# Patient Record
Sex: FEMALE | Race: WHITE | NOT HISPANIC OR LATINO
[De-identification: names, ages, dates, MRNs, and addresses within clinical notes are randomized per-mention and may not be internally consistent; named-entity substitution may affect disease eponyms.]

---

## 2017-12-28 ENCOUNTER — TRANSCRIPTION ENCOUNTER (OUTPATIENT)
Age: 62
End: 2017-12-28

## 2017-12-28 ENCOUNTER — OUTPATIENT (OUTPATIENT)
Dept: OUTPATIENT SERVICES | Facility: HOSPITAL | Age: 62
LOS: 1 days | End: 2017-12-28
Payer: COMMERCIAL

## 2017-12-28 DIAGNOSIS — M17.12 UNILATERAL PRIMARY OSTEOARTHRITIS, LEFT KNEE: ICD-10-CM

## 2018-01-08 ENCOUNTER — OUTPATIENT (OUTPATIENT)
Dept: OUTPATIENT SERVICES | Facility: HOSPITAL | Age: 63
LOS: 1 days | End: 2018-01-08
Payer: COMMERCIAL

## 2018-01-08 ENCOUNTER — TRANSCRIPTION ENCOUNTER (OUTPATIENT)
Age: 63
End: 2018-01-08

## 2018-01-08 DIAGNOSIS — M17.12 UNILATERAL PRIMARY OSTEOARTHRITIS, LEFT KNEE: ICD-10-CM

## 2018-01-08 PROCEDURE — 77002 NEEDLE LOCALIZATION BY XRAY: CPT

## 2018-01-08 PROCEDURE — 64450 NJX AA&/STRD OTHER PN/BRANCH: CPT | Mod: LT

## 2019-07-03 ENCOUNTER — TRANSCRIPTION ENCOUNTER (OUTPATIENT)
Age: 64
End: 2019-07-03

## 2021-02-03 ENCOUNTER — OUTPATIENT (OUTPATIENT)
Dept: OUTPATIENT SERVICES | Facility: HOSPITAL | Age: 66
LOS: 1 days | Discharge: HOME | End: 2021-02-03
Payer: COMMERCIAL

## 2021-02-03 ENCOUNTER — APPOINTMENT (OUTPATIENT)
Dept: PODIATRY | Facility: HOSPITAL | Age: 66
End: 2021-02-03
Payer: COMMERCIAL

## 2021-02-03 VITALS
HEART RATE: 70 BPM | OXYGEN SATURATION: 99 % | TEMPERATURE: 98 F | SYSTOLIC BLOOD PRESSURE: 137 MMHG | BODY MASS INDEX: 31.41 KG/M2 | RESPIRATION RATE: 18 BRPM | WEIGHT: 160 LBS | HEIGHT: 60 IN | DIASTOLIC BLOOD PRESSURE: 73 MMHG

## 2021-02-03 DIAGNOSIS — S91.309A UNSPECIFIED OPEN WOUND, UNSPECIFIED FOOT, INITIAL ENCOUNTER: ICD-10-CM

## 2021-02-03 DIAGNOSIS — M19.90 UNSPECIFIED OSTEOARTHRITIS, UNSPECIFIED SITE: ICD-10-CM

## 2021-02-03 DIAGNOSIS — Z83.3 FAMILY HISTORY OF DIABETES MELLITUS: ICD-10-CM

## 2021-02-03 DIAGNOSIS — Z86.39 PERSONAL HISTORY OF OTHER ENDOCRINE, NUTRITIONAL AND METABOLIC DISEASE: ICD-10-CM

## 2021-02-03 DIAGNOSIS — L84 CORNS AND CALLOSITIES: ICD-10-CM

## 2021-02-03 DIAGNOSIS — I87.2 VENOUS INSUFFICIENCY (CHRONIC) (PERIPHERAL): ICD-10-CM

## 2021-02-03 DIAGNOSIS — G81.94 HEMIPLEGIA, UNSPECIFIED AFFECTING LEFT NONDOMINANT SIDE: ICD-10-CM

## 2021-02-03 DIAGNOSIS — Z82.49 FAMILY HISTORY OF ISCHEMIC HEART DISEASE AND OTHER DISEASES OF THE CIRCULATORY SYSTEM: ICD-10-CM

## 2021-02-03 DIAGNOSIS — Z86.73 PERSONAL HISTORY OF TRANSIENT ISCHEMIC ATTACK (TIA), AND CEREBRAL INFARCTION W/OUT RESIDUAL DEFICITS: ICD-10-CM

## 2021-02-03 DIAGNOSIS — Z87.891 PERSONAL HISTORY OF NICOTINE DEPENDENCE: ICD-10-CM

## 2021-02-03 DIAGNOSIS — Z87.19 PERSONAL HISTORY OF OTHER DISEASES OF THE DIGESTIVE SYSTEM: ICD-10-CM

## 2021-02-03 DIAGNOSIS — Z86.79 PERSONAL HISTORY OF OTHER DISEASES OF THE CIRCULATORY SYSTEM: ICD-10-CM

## 2021-02-03 PROCEDURE — G0463: CPT

## 2021-02-03 PROCEDURE — 99203 OFFICE O/P NEW LOW 30 MIN: CPT

## 2021-02-03 RX ORDER — AMLODIPINE BESYLATE 5 MG/1
5 TABLET ORAL DAILY
Refills: 0 | Status: ACTIVE | COMMUNITY

## 2021-02-03 RX ORDER — ATORVASTATIN CALCIUM 40 MG/1
40 TABLET, FILM COATED ORAL DAILY
Refills: 0 | Status: ACTIVE | COMMUNITY

## 2021-02-03 RX ORDER — TRAMADOL HYDROCHLORIDE 50 MG/1
50 TABLET, COATED ORAL 4 TIMES DAILY
Refills: 0 | Status: ACTIVE | COMMUNITY

## 2021-02-03 RX ORDER — DOCUSATE SODIUM 100 MG/1
100 CAPSULE ORAL DAILY
Refills: 0 | Status: ACTIVE | COMMUNITY

## 2021-02-03 RX ORDER — SENNOSIDES 8.6 MG/1
TABLET ORAL DAILY
Refills: 0 | Status: ACTIVE | COMMUNITY

## 2021-02-03 RX ORDER — METOPROLOL TARTRATE 50 MG/1
50 TABLET, FILM COATED ORAL DAILY
Refills: 0 | Status: ACTIVE | COMMUNITY

## 2021-02-03 RX ORDER — ASPIRIN 81 MG
81 TABLET, DELAYED RELEASE (ENTERIC COATED) ORAL DAILY
Refills: 0 | Status: ACTIVE | COMMUNITY

## 2021-02-03 RX ORDER — HYOSCYAMINE SULFATE 0.375 MG
0.38 CAPSULE, EXTENDED RELEASE 12 HR ORAL
Refills: 0 | Status: ACTIVE | COMMUNITY

## 2021-02-03 RX ORDER — TRAZODONE HYDROCHLORIDE 50 MG/1
50 TABLET ORAL DAILY
Refills: 0 | Status: ACTIVE | COMMUNITY

## 2021-02-03 RX ORDER — PANTOPRAZOLE 40 MG/1
40 TABLET, DELAYED RELEASE ORAL TWICE DAILY
Refills: 0 | Status: ACTIVE | COMMUNITY

## 2021-02-03 RX ORDER — CHLORHEXIDINE GLUCONATE 4 %
5 LIQUID (ML) TOPICAL DAILY
Refills: 0 | Status: ACTIVE | COMMUNITY

## 2021-02-03 RX ORDER — DULOXETINE HYDROCHLORIDE 20 MG/1
20 CAPSULE, DELAYED RELEASE ORAL DAILY
Refills: 0 | Status: ACTIVE | COMMUNITY

## 2021-02-04 ENCOUNTER — NON-APPOINTMENT (OUTPATIENT)
Age: 66
End: 2021-02-04

## 2021-02-04 DIAGNOSIS — Z88.5 ALLERGY STATUS TO NARCOTIC AGENT: ICD-10-CM

## 2021-02-04 DIAGNOSIS — Z82.49 FAMILY HISTORY OF ISCHEMIC HEART DISEASE AND OTHER DISEASES OF THE CIRCULATORY SYSTEM: ICD-10-CM

## 2021-02-04 DIAGNOSIS — Z79.899 OTHER LONG TERM (CURRENT) DRUG THERAPY: ICD-10-CM

## 2021-02-04 DIAGNOSIS — I10 ESSENTIAL (PRIMARY) HYPERTENSION: ICD-10-CM

## 2021-02-04 DIAGNOSIS — E78.00 PURE HYPERCHOLESTEROLEMIA, UNSPECIFIED: ICD-10-CM

## 2021-02-04 DIAGNOSIS — Z79.82 LONG TERM (CURRENT) USE OF ASPIRIN: ICD-10-CM

## 2021-02-04 DIAGNOSIS — S90.812A ABRASION, LEFT FOOT, INITIAL ENCOUNTER: ICD-10-CM
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
## 2021-02-04 DIAGNOSIS — I69.352 HEMIPLEGIA AND HEMIPARESIS FOLLOWING CEREBRAL INFARCTION AFFECTING LEFT DOMINANT SIDE: ICD-10-CM
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
## 2021-02-04 DIAGNOSIS — Z87.891 PERSONAL HISTORY OF NICOTINE DEPENDENCE: ICD-10-CM
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
## 2021-02-04 DIAGNOSIS — Z90.710 ACQUIRED ABSENCE OF BOTH CERVIX AND UTERUS: ICD-10-CM

## 2021-02-04 DIAGNOSIS — Z83.3 FAMILY HISTORY OF DIABETES MELLITUS: ICD-10-CM

## 2021-02-04 DIAGNOSIS — L84 CORNS AND CALLOSITIES: ICD-10-CM

## 2021-02-04 DIAGNOSIS — Z98.1 ARTHRODESIS STATUS: ICD-10-CM

## 2021-02-04 PROBLEM — I87.2 VENOUS INSUFFICIENCY: Status: RESOLVED | Noted: 2021-02-03 | Resolved: 2021-02-04

## 2021-02-04 NOTE — REVIEW OF SYSTEMS
[Fever] : no fever [Eye Pain] : no eye pain [Loss Of Hearing] : no hearing loss [Shortness Of Breath] : no shortness of breath [Abdominal Pain] : no abdominal pain [Joint Stiffness] : joint stiffness [Skin Lesions] : no skin lesions [Skin Wound] : no skin wound [Anxiety] : no anxiety [Easy Bleeding] : no tendency for easy bleeding [Negative] : Endocrine [FreeTextEntry5] : HTN, HLD  [de-identified] : s/p left side stroke

## 2021-02-04 NOTE — HISTORY OF PRESENT ILLNESS
[FreeTextEntry1] : The wound is located on patient's left lateral foot. Patient reports that she thinks her leg brace caused the wound on her left lateral foot.  In 12/2020 patient saw her PCP and was tx with oral antibiotic for left lateral foot wound.  Patient reports that she was applying a triple antibiotic on wound and reports that the wound has closed up.   Patient also presents with ecchymosis areas on left foot and left great toe with fungus.  Patient reports that she also has a hx of blisters forming on her foot.  Patient's PCP recommended patient see a DPM and she chose the [facility].  Patient presents with a left leg brace from thigh to foot.

## 2021-02-04 NOTE — VITALS
[FreeTextEntry3] : left foot - 5/10 - radiating [FreeTextEntry1] : lying down with leg elevating [FreeTextEntry2] : weight bearing [FreeTextEntry4] : leg elevating

## 2021-02-04 NOTE — ASSESSMENT
[Verbal] : Verbal [Patient] : Patient [Dressing changes] : dressing changes [Foot Care] : foot care [Skin Care] : skin care [Signs and symptoms of infection] : sign and symptoms of infection [How and When to Call] : how and when to call [Labs and Tests] : labs and tests [Patient responsibility to plan of care] : patient responsibility to plan of care [] : Yes [Stable] : stable [Home] : Home [Wheelchair] : Wheelchair [Not Applicable - Long Term Care/Home Health Agency] : Long Term Care/Home Health Agency: Not Applicable [Spouse] : Spouse [Good - alert, interested, motivated] : Good - alert, interested, motivated [Verbalizes knowledge/Understanding] : Verbalizes knowledge/understanding [Pain Management] : pain management [FreeTextEntry2] : Promote optimal skin integrity, offloading, infection prevention, pain management [FreeTextEntry4] : Circulation:\par \par Dorsalis Pedis:  bilateral palpable\par Posterior Tibialis:  bilateral unable to palpate\par Doppler Pulses:  bilateral  present\par Extremity Color:  Right pink, Left pink with scattered areas of ecchymosis\par Extremity Temperature:  bilateral warm\par Capillary Refill:  bilateral <3 sec \par RAJ: Non-invasive vascular studies (venous and arterial) ordered - submitted for authorization\par \par Orthotist consult next visit to evaluate left leg to foot brace and possible need for a new brace.  Nurse manager to arrange with BARON Crespo orthotist.\par \par Xray ordered to be done next visit.\par \par Instructed patient to schedule an appointment with [facility] in [address] [address].  Provided patient with contact #.\par  \par f/u 2 weeks

## 2021-02-04 NOTE — PHYSICAL EXAM
[1+] : left 1+ [Ankle Swelling (On Exam)] : not present [Varicose Veins Of Lower Extremities] : not present [Purpura] : no purpura  [Petechiae] : no petechiae [Skin Ulcer] : no ulcer [Skin Induration] : no induration [Alert] : alert [Oriented to Person] : oriented to person [Oriented to Place] : oriented to place [Calm] : calm [de-identified] : calm , present with her  [de-identified] : HTN, HLD [de-identified] : patient no ambulatory , left side stroke [de-identified] : abrasions from brace on the left heel and lateral left foot  [de-identified] : left side stroke [FreeTextEntry1] : Left lateral foot - callus, peeling epithelium [de-identified] : none [de-identified] : NSC [FreeTextEntry7] : Left plantar heel - ecchymosis [de-identified] : Left hallux - ecchymosis - thickened toe nail - no open wound

## 2021-02-04 NOTE — PLAN
[FreeTextEntry1] : Patient to off load heels while sleeping , patient brace to be evaluated by orthotist Spent 30  minutes for patient care and medical decision making.\par

## 2021-02-17 ENCOUNTER — APPOINTMENT (OUTPATIENT)
Dept: PODIATRY | Facility: HOSPITAL | Age: 66
End: 2021-02-17
Payer: COMMERCIAL

## 2021-02-17 ENCOUNTER — RESULT REVIEW (OUTPATIENT)
Age: 66
End: 2021-02-17

## 2021-02-17 ENCOUNTER — OUTPATIENT (OUTPATIENT)
Dept: OUTPATIENT SERVICES | Facility: HOSPITAL | Age: 66
LOS: 1 days | Discharge: HOME | End: 2021-02-17
Payer: COMMERCIAL

## 2021-02-17 VITALS
BODY MASS INDEX: 31.41 KG/M2 | HEIGHT: 60 IN | OXYGEN SATURATION: 98 % | WEIGHT: 160 LBS | SYSTOLIC BLOOD PRESSURE: 141 MMHG | RESPIRATION RATE: 20 BRPM | DIASTOLIC BLOOD PRESSURE: 80 MMHG | HEART RATE: 68 BPM

## 2021-02-17 DIAGNOSIS — S90.812A ABRASION, LEFT FOOT, INITIAL ENCOUNTER: ICD-10-CM

## 2021-02-17 DIAGNOSIS — S90.812D: ICD-10-CM

## 2021-02-17 DIAGNOSIS — S91.309A UNSPECIFIED OPEN WOUND, UNSPECIFIED FOOT, INITIAL ENCOUNTER: ICD-10-CM

## 2021-02-17 PROCEDURE — 73630 X-RAY EXAM OF FOOT: CPT | Mod: 26,50

## 2021-02-17 PROCEDURE — 99213 OFFICE O/P EST LOW 20 MIN: CPT

## 2021-02-17 PROCEDURE — G0463: CPT

## 2021-02-17 PROCEDURE — 73630 X-RAY EXAM OF FOOT: CPT

## 2021-02-18 ENCOUNTER — NON-APPOINTMENT (OUTPATIENT)
Age: 66
End: 2021-02-18

## 2021-02-18 DIAGNOSIS — Z88.5 ALLERGY STATUS TO NARCOTIC AGENT: ICD-10-CM

## 2021-02-18 DIAGNOSIS — Z90.710 ACQUIRED ABSENCE OF BOTH CERVIX AND UTERUS: ICD-10-CM

## 2021-02-18 DIAGNOSIS — Z79.82 LONG TERM (CURRENT) USE OF ASPIRIN: ICD-10-CM

## 2021-02-18 DIAGNOSIS — L84 CORNS AND CALLOSITIES: ICD-10-CM

## 2021-02-18 DIAGNOSIS — S90.812D ABRASION, LEFT FOOT, SUBSEQUENT ENCOUNTER: ICD-10-CM

## 2021-02-18 DIAGNOSIS — Z87.891 PERSONAL HISTORY OF NICOTINE DEPENDENCE: ICD-10-CM
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
## 2021-02-18 DIAGNOSIS — Z98.1 ARTHRODESIS STATUS: ICD-10-CM

## 2021-02-18 DIAGNOSIS — Z83.3 FAMILY HISTORY OF DIABETES MELLITUS: ICD-10-CM

## 2021-02-18 DIAGNOSIS — I69.352 HEMIPLEGIA AND HEMIPARESIS FOLLOWING CEREBRAL INFARCTION AFFECTING LEFT DOMINANT SIDE: ICD-10-CM

## 2021-02-18 DIAGNOSIS — I10 ESSENTIAL (PRIMARY) HYPERTENSION: ICD-10-CM
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
## 2021-02-18 DIAGNOSIS — Z82.49 FAMILY HISTORY OF ISCHEMIC HEART DISEASE AND OTHER DISEASES OF THE CIRCULATORY SYSTEM: ICD-10-CM

## 2021-02-18 DIAGNOSIS — Z79.899 OTHER LONG TERM (CURRENT) DRUG THERAPY: ICD-10-CM

## 2021-02-18 DIAGNOSIS — E78.00 PURE HYPERCHOLESTEROLEMIA, UNSPECIFIED: ICD-10-CM

## 2021-02-18 PROBLEM — S90.812A: Status: ACTIVE | Noted: 2021-02-04

## 2021-02-18 NOTE — PHYSICAL EXAM
[1+] : left 1+ [Alert] : alert [Oriented to Person] : oriented to person [Oriented to Place] : oriented to place [Calm] : calm [Ankle Swelling (On Exam)] : not present [Varicose Veins Of Lower Extremities] : not present [Purpura] : no purpura  [Petechiae] : no petechiae [Skin Ulcer] : no ulcer [Skin Induration] : no induration [de-identified] : calm , present with her  [de-identified] : patient no ambulatory , left side stroke [de-identified] : HTN, HLD [de-identified] : abrasions from brace on the left heel and lateral left foot  [de-identified] : left side stroke [FreeTextEntry1] : Left Lateral Foot - Callus, Peeling Epithelium [FreeTextEntry7] : Left Plantar Heel - Ecchymosis [de-identified] : Cleansed with Normal Saline  [de-identified] : DPM trimmed nail  [de-identified] : Left Hallux - Ecchymosis - thickened toe nail - no open wound [TWNoteComboBox4] : None

## 2021-02-18 NOTE — VITALS
[] : No [de-identified] : Pt reports pain 6/10 [FreeTextEntry3] : Left Dorsal Foot  [FreeTextEntry1] : Elevation  [FreeTextEntry4] : legs elevated  [FreeTextEntry2] : Weight Bearing

## 2021-02-18 NOTE — REVIEW OF SYSTEMS
[Joint Stiffness] : joint stiffness [Negative] : Endocrine [Fever] : no fever [Eye Pain] : no eye pain [Loss Of Hearing] : no hearing loss [Shortness Of Breath] : no shortness of breath [Abdominal Pain] : no abdominal pain [Skin Lesions] : no skin lesions [Skin Wound] : no skin wound [Anxiety] : no anxiety [Easy Bleeding] : no tendency for easy bleeding [FreeTextEntry5] : HTN, HLD  [de-identified] : s/p left side stroke

## 2021-02-18 NOTE — ASSESSMENT
[Stable] : stable [Wheelchair] : Wheelchair [Home] : Home [Not Applicable - Long Term Care/Home Health Agency] : Long Term Care/Home Health Agency: Not Applicable [Verbal] : Verbal [Patient] : Patient [Good - alert, interested, motivated] : Good - alert, interested, motivated [Verbalizes knowledge/Understanding] : Verbalizes knowledge/understanding [Foot Care] : foot care [Skin Care] : skin care [Pressure relief] : pressure relief [Signs and symptoms of infection] : sign and symptoms of infection [How and When to Call] : how and when to call [Pain Management] : pain management [Off-loading] : off-loading [Patient responsibility to plan of care] : patient responsibility to plan of care [] : No [FreeTextEntry3] : No open wounds but pt is still experiencing pain  [FreeTextEntry2] : Infection Prevention\par Localized Wound Care\par Offloading / Pressure Relief\par  [FreeTextEntry4] : F/U to [facility] for assessment in Two Weeks\par Advised consult with Dr. Esqueda \par Physician reviewed X-rays of bilateral feet with pt and spouse\yuliana Crespo from [facility] measured pt for brace

## 2021-02-18 NOTE — PLAN
[FreeTextEntry1] : Patient to have vascular studies and orthotist is in process of making new braces for patient left leg . No open wounds and patient currently has no pain Spent 20 minutes for patient care and medical decision making.\par

## 2021-02-18 NOTE — HISTORY OF PRESENT ILLNESS
[FreeTextEntry1] : Generalized pain of the left foot , with mottling of the skin and cooler temperature compared to the right . Pain is transient and not regular in occurrence .

## 2021-02-24 ENCOUNTER — OUTPATIENT (OUTPATIENT)
Dept: OUTPATIENT SERVICES | Facility: HOSPITAL | Age: 66
LOS: 1 days | Discharge: HOME | End: 2021-02-24
Payer: COMMERCIAL

## 2021-02-24 ENCOUNTER — APPOINTMENT (OUTPATIENT)
Dept: VASCULAR SURGERY | Facility: CLINIC | Age: 66
End: 2021-02-24
Payer: COMMERCIAL

## 2021-02-24 VITALS
DIASTOLIC BLOOD PRESSURE: 77 MMHG | SYSTOLIC BLOOD PRESSURE: 132 MMHG | HEART RATE: 72 BPM | TEMPERATURE: 97.3 F | OXYGEN SATURATION: 99 % | HEIGHT: 60 IN | BODY MASS INDEX: 31.41 KG/M2 | RESPIRATION RATE: 20 BRPM | WEIGHT: 160 LBS

## 2021-02-24 DIAGNOSIS — S90.812D ABRASION, LEFT FOOT, SUBSEQUENT ENCOUNTER: ICD-10-CM

## 2021-02-24 DIAGNOSIS — R23.0 CYANOSIS: ICD-10-CM

## 2021-02-24 PROCEDURE — G0463: CPT

## 2021-02-24 PROCEDURE — 99072 ADDL SUPL MATRL&STAF TM PHE: CPT

## 2021-02-24 PROCEDURE — 99203 OFFICE O/P NEW LOW 30 MIN: CPT

## 2021-02-24 NOTE — REASON FOR VISIT
[Consultation] : a consultation visit [FreeTextEntry1] : L foot pain with cyanosis and decreased temperature.

## 2021-02-24 NOTE — ASSESSMENT
[FreeTextEntry1] : 64 yo M with hx of hemorrhagic stroke. Increasing pain over dorsum of L foot with decreased temperature and cyanosis. She does have a palpable DP pulse. No clear etiology for pain. It may represent post stroke hyperalgesia with autonomic dysfunction.

## 2021-02-24 NOTE — REVIEW OF SYSTEMS
[Limb Pain] : limb pain [As Noted in HPI] : as noted in HPI [Limb Weakness] : limb weakness [Difficulty Walking] : difficulty walking [Negative] : Heme/Lymph

## 2021-02-24 NOTE — PHYSICAL EXAM
[1+] : left 1+ [2+] : left 2+ [Skin Ulcer] : no ulcer [Skin Induration] : no induration [Alert] : alert [Oriented to Person] : oriented to person [Oriented to Place] : oriented to place [Oriented to Time] : oriented to time [Calm] : calm [de-identified] : L UE and LLE paretic [FreeTextEntry1] : L foot is cyanotic with 2 sec cap ref. DP is palpable. \par Cold to touch

## 2021-02-24 NOTE — HISTORY OF PRESENT ILLNESS
[FreeTextEntry1] : 66 yo F with hx of HTN that  suffered a hemorrhagic stroke 2-3 years ago after excessing. 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 She has been having pain over the dorsum of the L foot. Pain is not associated to any position or specific activity. Patient walks with a L leg brace and cane. Denies any other medical problem or problems in the LLE prior to the stroke.

## 2021-02-25 DIAGNOSIS — Z87.891 PERSONAL HISTORY OF NICOTINE DEPENDENCE: ICD-10-CM

## 2021-02-25 DIAGNOSIS — I10 ESSENTIAL (PRIMARY) HYPERTENSION: ICD-10-CM

## 2021-02-25 DIAGNOSIS — Z79.899 OTHER LONG TERM (CURRENT) DRUG THERAPY: ICD-10-CM

## 2021-02-25 DIAGNOSIS — M79.672 PAIN IN LEFT FOOT: ICD-10-CM

## 2021-02-25 DIAGNOSIS — Z88.5 ALLERGY STATUS TO NARCOTIC AGENT: ICD-10-CM

## 2021-02-25 DIAGNOSIS — Z82.49 FAMILY HISTORY OF ISCHEMIC HEART DISEASE AND OTHER DISEASES OF THE CIRCULATORY SYSTEM: ICD-10-CM

## 2021-02-25 DIAGNOSIS — M19.90 UNSPECIFIED OSTEOARTHRITIS, UNSPECIFIED SITE: ICD-10-CM

## 2021-02-25 DIAGNOSIS — Z98.1 ARTHRODESIS STATUS: ICD-10-CM

## 2021-02-25 DIAGNOSIS — Z79.82 LONG TERM (CURRENT) USE OF ASPIRIN: ICD-10-CM

## 2021-02-25 DIAGNOSIS — Z90.710 ACQUIRED ABSENCE OF BOTH CERVIX AND UTERUS: ICD-10-CM

## 2021-02-25 DIAGNOSIS — R23.0 CYANOSIS: ICD-10-CM

## 2021-02-25 DIAGNOSIS — I69.352 HEMIPLEGIA AND HEMIPARESIS FOLLOWING CEREBRAL INFARCTION AFFECTING LEFT DOMINANT SIDE: ICD-10-CM

## 2021-02-25 DIAGNOSIS — E78.00 PURE HYPERCHOLESTEROLEMIA, UNSPECIFIED: ICD-10-CM

## 2021-02-25 DIAGNOSIS — Z83.3 FAMILY HISTORY OF DIABETES MELLITUS: ICD-10-CM

## 2021-02-26 ENCOUNTER — APPOINTMENT (OUTPATIENT)
Dept: PODIATRY | Facility: CLINIC | Age: 66
End: 2021-02-26
Payer: COMMERCIAL

## 2021-02-26 ENCOUNTER — NON-APPOINTMENT (OUTPATIENT)
Age: 66
End: 2021-02-26

## 2021-02-26 VITALS
HEIGHT: 60 IN | TEMPERATURE: 97.2 F | OXYGEN SATURATION: 98 % | WEIGHT: 160 LBS | HEART RATE: 66 BPM | DIASTOLIC BLOOD PRESSURE: 79 MMHG | BODY MASS INDEX: 31.41 KG/M2 | SYSTOLIC BLOOD PRESSURE: 123 MMHG

## 2021-02-26 DIAGNOSIS — M79.89 PAIN IN LEFT LOWER LEG: ICD-10-CM

## 2021-02-26 DIAGNOSIS — M54.16 RADICULOPATHY, LUMBAR REGION: ICD-10-CM

## 2021-02-26 DIAGNOSIS — M79.662 PAIN IN LEFT LOWER LEG: ICD-10-CM

## 2021-02-26 DIAGNOSIS — M79.672 PAIN IN LEFT FOOT: ICD-10-CM

## 2021-02-26 PROCEDURE — 99072 ADDL SUPL MATRL&STAF TM PHE: CPT

## 2021-02-26 PROCEDURE — 99214 OFFICE O/P EST MOD 30 MIN: CPT

## 2021-02-26 NOTE — REVIEW OF SYSTEMS
[Limb Swelling] : limb swelling [Fever] : no fever [Eye Pain] : no eye pain [Earache] : no earache [Cough] : no cough [Abdominal Pain] : no abdominal pain [Skin Wound] : no skin wound [FreeTextEntry5] : hx of hemorrhagic stroke 2-3 years prior, htn, hld [FreeTextEntry9] : LUE & LLE palsy, LLE pain

## 2021-02-26 NOTE — HISTORY OF PRESENT ILLNESS
[FreeTextEntry1] : Patient presents with daughter regarding pain to the left foot. Patient notes that she feels pain to her left foot and notes that it shoots up the leg intermittently. patient notes that she does not notice any patterns and states that she feels the pain even when laying in bed. Patient suffered a hemorrhagic stroke 2-3 years prior and has LUE and LLE palsy as a permanent deficit. Patient relates that she is awaiting a new left lower leg brace from the orthotist. Denies any other complaints.

## 2021-02-26 NOTE — ASSESSMENT
[FreeTextEntry1] : Patient examined and evaluated. Discussed possible etiology of symptoms with patient. Radiographs reviewed with patient and daughter. Advised regarding possibility of neurological etiology and will refer to neurology for possible EMG/NCV studies. Patient will need a custom left fully lined KAFO with dorsi assist, varus/valgus control, polycentric knee joint due to the condition being permanent and the need to control the foot and leg in multiple planes as custom bracing is necessary. Spent 30 minutes for patient care and medical decision making.\par

## 2021-02-26 NOTE — PHYSICAL EXAM
[General Appearance - Alert] : alert [General Appearance - In No Acute Distress] : in no acute distress [2+] : left foot dorsalis pedis 2+ [Diminished Throughout Left Foot] : diminished sensation with monofilament testing throughout left foot [Ankle Swelling (On Exam)] : not present [Varicose Veins Of Lower Extremities] : not present [] : not present [de-identified] : No tenderness to palpation or ROM of the left foot and ankle. 1/5 strength in the left foot, 5/5 strength in the right foot in all quadrants. [FreeTextEntry1] : No open lesions, no lacerations, no macerations

## 2021-03-04 ENCOUNTER — NON-APPOINTMENT (OUTPATIENT)
Age: 66
End: 2021-03-04

## 2021-03-11 ENCOUNTER — RESULT REVIEW (OUTPATIENT)
Age: 66
End: 2021-03-11

## 2021-03-11 ENCOUNTER — OUTPATIENT (OUTPATIENT)
Dept: OUTPATIENT SERVICES | Facility: HOSPITAL | Age: 66
LOS: 1 days | End: 2021-03-11
Payer: COMMERCIAL

## 2021-03-11 DIAGNOSIS — I70.223 ATHEROSCLEROSIS OF NATIVE ARTERIES OF EXTREMITIES WITH REST PAIN, BILATERAL LEGS: ICD-10-CM

## 2021-03-11 PROCEDURE — 93923 UPR/LXTR ART STDY 3+ LVLS: CPT

## 2021-03-11 PROCEDURE — 93923 UPR/LXTR ART STDY 3+ LVLS: CPT | Mod: 26

## 2022-12-01 ENCOUNTER — APPOINTMENT (OUTPATIENT)
Dept: OBGYN | Facility: CLINIC | Age: 67
End: 2022-12-01

## 2022-12-01 VITALS — WEIGHT: 160 LBS | BODY MASS INDEX: 31.41 KG/M2 | HEIGHT: 60 IN

## 2022-12-01 DIAGNOSIS — R10.11 RIGHT UPPER QUADRANT PAIN: ICD-10-CM

## 2022-12-01 DIAGNOSIS — Z00.00 ENCOUNTER FOR GENERAL ADULT MEDICAL EXAMINATION W/OUT ABNORMAL FINDINGS: ICD-10-CM

## 2022-12-01 PROCEDURE — 99387 INIT PM E/M NEW PAT 65+ YRS: CPT

## 2022-12-01 PROCEDURE — 99203 OFFICE O/P NEW LOW 30 MIN: CPT | Mod: 25

## 2022-12-01 NOTE — HISTORY OF PRESENT ILLNESS
[FreeTextEntry1] : Patient is a 67-year-old  2 para 2 last menstrual period at approximately 52 at which point patient had undergone a hysterectomy\par Patient presents for annual visit after 5 years of not being seen\par Patient has history of severe CVA affecting the entire left side of her body,,, since approximately 4 years prior\par Patient does complain of right mid abdominal pain pain on and off for the past month and also urinary loss

## 2022-12-01 NOTE — PLAN
[FreeTextEntry1] : Patient is a 67-year-old  3 para 2 last menstrual period approximate age early 50s at which point patient undergone a hysterectomy\par Patient presents for first visit after not being seen for over 5 years\par Patient does complain of right mid abdominal pain over the past 1 month that has been coming on and off\par Patient also does complain of urinary loss with atopic type of stress movements or motions\par Patient states she has unfortunately experienced a CVA approximately 4 years prior and has residual left hemiparesis both upper and lower extremities\par Physical exam reveals a well-developed well-nourished female slightly obese,,, BMI 31\par Heart regular rhythm and rate, lungs clear, breast no mass nontender no skin lesion or nipple discharge no adenopathy,,, evidence of extensive scarring from previous surgery, abdomen soft nontender no organomegaly.\par Pelvic exam shows normal female external genitalia atrophic, vagina lesions atrophic, cervix uterus absent nontender area, adnexa no mass nontender.\par Pap smear not performed\par Patient will be given a prescription for breast mammogram and sonogram\par Patient also be given a prescription for a CAT scan to further evaluate the abdominal pain does not appear to be GYN etiology\par Patient states she was diagnosed with COVID back in January of this year denies any residual symptoms deficits and has been vaccinated and boosted\par After discussion patient does state that she has experiencing urine loss, spontaneously, when she is trying to move her body from the wheelchair to a chair or other furniture in the room\par Discussed possibly that this may be residual from her CVA and if this has been addressed by her neurologist\par Patient and  state they have not been seen by neurologist in many years\par Patient will be referred to a neurologist for further evaluation and also your gynecologist Dr. White for further evaluation\par Any further treatment at this point will be based on the CAT scan findings,,, unlikely etiology to be GYN in origin

## 2022-12-01 NOTE — PHYSICAL EXAM
[Chaperone Present] : A chaperone was present in the examining room during all aspects of the physical examination [Appropriately responsive] : appropriately responsive [Alert] : alert [No Acute Distress] : no acute distress [No Lymphadenopathy] : no lymphadenopathy [Regular Rate Rhythm] : regular rate rhythm [No Murmurs] : no murmurs [Clear to Auscultation B/L] : clear to auscultation bilaterally [Soft] : soft [Non-tender] : non-tender [Non-distended] : non-distended [No HSM] : No HSM [No Lesions] : no lesions [No Mass] : no mass [Oriented x3] : oriented x3 [Examination Of The Breasts] : a normal appearance [No Masses] : no breast masses were palpable [Vulvar Atrophy] : vulvar atrophy [Labia Majora] : normal [Labia Minora] : normal [Normal] : normal [Atrophy] : atrophy [Absent] : absent [Uterine Adnexae] : normal [FreeTextEntry6] : No masses, nontender, no skin changes, nipple discharge, no adenopathy,,, evidence of scarring from previous surgery [FreeTextEntry8] : Left side hemiparesis of both upper and lower extremity status post CVA [Tenderness] : nontender [Mass ___ cm] : no uterine mass was palpated [FreeTextEntry1] : Extreme atrophy [FreeTextEntry4] : Extreme atrophy

## 2022-12-20 ENCOUNTER — NON-APPOINTMENT (OUTPATIENT)
Age: 67
End: 2022-12-20

## 2022-12-23 ENCOUNTER — APPOINTMENT (OUTPATIENT)
Dept: PODIATRY | Facility: CLINIC | Age: 67
End: 2022-12-23
Payer: COMMERCIAL

## 2022-12-23 PROCEDURE — XXXXX: CPT | Mod: 1L

## 2022-12-23 RX ORDER — CICLOPIROX 80 MG/ML
8 SOLUTION TOPICAL
Qty: 1 | Refills: 2 | Status: ACTIVE | COMMUNITY
Start: 2022-12-23 | End: 1900-01-01

## 2023-03-24 ENCOUNTER — APPOINTMENT (OUTPATIENT)
Dept: PODIATRY | Facility: CLINIC | Age: 68
End: 2023-03-24
Payer: MEDICARE

## 2023-03-24 VITALS
OXYGEN SATURATION: 98 % | DIASTOLIC BLOOD PRESSURE: 76 MMHG | WEIGHT: 160 LBS | TEMPERATURE: 98.2 F | BODY MASS INDEX: 31.41 KG/M2 | HEART RATE: 70 BPM | SYSTOLIC BLOOD PRESSURE: 134 MMHG | HEIGHT: 60 IN

## 2023-03-24 PROCEDURE — 99213 OFFICE O/P EST LOW 20 MIN: CPT

## 2023-05-08 RX ORDER — CICLOPIROX 80 MG/ML
8 SOLUTION TOPICAL
Qty: 1 | Refills: 2 | Status: ACTIVE | COMMUNITY
Start: 2023-03-24

## 2023-05-08 NOTE — PHYSICAL EXAM
[General Appearance - Alert] : alert [General Appearance - In No Acute Distress] : in no acute distress [Varicose Veins Of Lower Extremities] : bilaterally [Ankle Swelling On The Right] : mild [1+] : left foot posterior tibialis 1+ [2+] : left foot dorsalis pedis 2+ [de-identified] : 5/5 muscle strength for all muscles and tendons crossing the foot and ankle joints, ankle joint and STJ ROM intact b/l. No pain with calf compression b/l.\par  [FreeTextEntry1] : Light touch sensation intact to the level of the digits b/l. [Oriented To Time, Place, And Person] : oriented to person, place, and time [Affect] : the affect was normal

## 2023-05-08 NOTE — ASSESSMENT
[FreeTextEntry1] : \par Pt was examined and evaluated. Discussed etiology Pt advised regarding possible etiology of symptoms and discussed nail plate biopsy. All questions answered to pt satisfaction and pt verbalized understanding. Nails 1-5 bilaterally debrided and nail plate specimen was obtained and sent to pathology for KOH prep and fungal culture. Rx for ciclopirox and Lotrisone sent to pt's pharmacy. Recommended vascular follow for lower extremity edema and varicosities. Pt to follow up in 6 weeks to review pathology results an. Spent 30 minutes in patient care and medical decision making.\par

## 2023-05-08 NOTE — PHYSICAL EXAM
[General Appearance - Alert] : alert [General Appearance - In No Acute Distress] : in no acute distress [Varicose Veins Of Lower Extremities] : bilaterally [Ankle Swelling On The Right] : mild [1+] : left foot posterior tibialis 1+ [2+] : left foot dorsalis pedis 2+ [de-identified] : 5/5 muscle strength for all muscles and tendons crossing the foot and ankle joints, ankle joint and STJ ROM intact b/l. No pain with calf compression b/l.\par  [FreeTextEntry1] : Light touch sensation intact to the level of the digits b/l. [Oriented To Time, Place, And Person] : oriented to person, place, and time [Affect] : the affect was normal

## 2023-05-08 NOTE — ASSESSMENT
[FreeTextEntry1] : Pt was examined and evaluated. Discussed etiology Pt advised regarding possible etiology of symptoms and discussed nail plate biopsy. All questions answered to pt satisfaction and pt verbalized understanding. Nails 1-5 bilaterally debrided, nail culture and pathology (+) for fungal infection. Patient to c/w with topical application of Ciclopirox, RTC in 3 months.  Spent 20 minutes in patient care and medical decision making.\par

## 2023-05-08 NOTE — REVIEW OF SYSTEMS
[Fever] : no fever [Chills] : no chills [Eye Pain] : no eye pain [Red Eyes] : eyes not red [Loss Of Hearing] : no hearing loss [Earache] : no earache [Nosebleeds] : no nosebleeds [Chest Pain] : no chest pain [Shortness Of Breath] : no shortness of breath [Cough] : no cough [Abdominal Pain] : no abdominal pain [Vomiting] : no vomiting [Diarrhea] : no diarrhea [Joint Swelling] : no joint swelling [Joint Stiffness] : no joint stiffness [Confused] : no confusion [Suicidal] : not suicidal [de-identified] : discoloration noted to the left hallux and 2nd digit

## 2023-05-08 NOTE — REVIEW OF SYSTEMS
[Fever] : no fever [Chills] : no chills [Eye Pain] : no eye pain [Red Eyes] : eyes not red [Earache] : no earache [Loss Of Hearing] : no hearing loss [Nosebleeds] : no nosebleeds [Chest Pain] : no chest pain [Shortness Of Breath] : no shortness of breath [Cough] : no cough [Abdominal Pain] : no abdominal pain [Vomiting] : no vomiting [Diarrhea] : no diarrhea [Joint Swelling] : no joint swelling [Joint Stiffness] : no joint stiffness [Confused] : no confusion [Suicidal] : not suicidal [de-identified] : discoloration noted to the left hallux and 2nd digit

## 2023-05-08 NOTE — HISTORY OF PRESENT ILLNESS
[FreeTextEntry1] : Patient is 67 year old female presenting for presenting  complaining of thickened, elongated nails to the left hallux and 2nd digit . Patient relates that it has been present for approximately several years and notes that it has been worsening. Patient does not recall trauma to the nails in the past and relates that the nails are not  from the nail bed. Patient states that the nails are thickened and elongated and cause pain when walking with shoes. Patient states that the nails are not soft. Patient states that the nails are not easily breakable. Patient has had history of stroke with partial paralysis to the left side but has been getting better. \par \par \par

## 2023-05-08 NOTE — REASON FOR VISIT
[Follow-Up Visit] : a follow-up visit for [Other: _____] : [unfilled] [FreeTextEntry2] : RAISA BANGURA is being seen for a follow-up visit for Fungal infection on left foot. And possible fungal infection and ingrown nail.

## 2023-05-08 NOTE — HISTORY OF PRESENT ILLNESS
[FreeTextEntry1] : Patient is 67 year old female presenting for follow up for discoloration to the left hallux and second digit. Patient has been applying topical Ciclopirox to the nails. Patient still relates the discoloration to the nails.

## 2023-07-17 ENCOUNTER — APPOINTMENT (OUTPATIENT)
Dept: PODIATRY | Facility: CLINIC | Age: 68
End: 2023-07-17
Payer: MEDICARE

## 2023-07-17 VITALS
WEIGHT: 160 LBS | HEART RATE: 72 BPM | HEIGHT: 60 IN | DIASTOLIC BLOOD PRESSURE: 77 MMHG | BODY MASS INDEX: 31.41 KG/M2 | SYSTOLIC BLOOD PRESSURE: 133 MMHG | OXYGEN SATURATION: 95 % | TEMPERATURE: 98 F

## 2023-07-17 PROCEDURE — 99213 OFFICE O/P EST LOW 20 MIN: CPT

## 2023-07-17 RX ORDER — CICLOPIROX 80 MG/ML
8 SOLUTION TOPICAL
Qty: 2 | Refills: 3 | Status: COMPLETED | COMMUNITY
Start: 2023-07-17 | End: 2023-11-14

## 2023-07-17 NOTE — PHYSICAL EXAM
[General Appearance - Alert] : alert [General Appearance - In No Acute Distress] : in no acute distress [Varicose Veins Of Lower Extremities] : bilaterally [Ankle Swelling On The Right] : mild [1+] : left foot posterior tibialis 1+ [2+] : left foot dorsalis pedis 2+ [de-identified] : 5/5 muscle strength for all muscles and tendons crossing the foot and ankle joints, ankle joint and STJ ROM intact b/l. No pain with calf compression b/l.\par  [FreeTextEntry1] : Light touch sensation intact to the level of the digits b/l. [Oriented To Time, Place, And Person] : oriented to person, place, and time [Affect] : the affect was normal

## 2023-07-17 NOTE — REVIEW OF SYSTEMS
[Fever] : no fever [Chills] : no chills [Eye Pain] : no eye pain [Red Eyes] : eyes not red [Earache] : no earache [Loss Of Hearing] : no hearing loss [Nosebleeds] : no nosebleeds [Chest Pain] : no chest pain [Shortness Of Breath] : no shortness of breath [Cough] : no cough [Abdominal Pain] : no abdominal pain [Vomiting] : no vomiting [Diarrhea] : no diarrhea [Joint Swelling] : no joint swelling [Joint Stiffness] : no joint stiffness [Confused] : no confusion [Suicidal] : not suicidal [de-identified] : discoloration noted to the left hallux and 2nd digit

## 2023-10-16 ENCOUNTER — APPOINTMENT (OUTPATIENT)
Dept: PODIATRY | Facility: CLINIC | Age: 68
End: 2023-10-16
Payer: MEDICARE

## 2023-10-16 PROCEDURE — 99213 OFFICE O/P EST LOW 20 MIN: CPT

## 2024-01-22 ENCOUNTER — APPOINTMENT (OUTPATIENT)
Dept: PODIATRY | Facility: CLINIC | Age: 69
End: 2024-01-22
Payer: MEDICARE

## 2024-01-22 VITALS
WEIGHT: 160 LBS | TEMPERATURE: 97.9 F | SYSTOLIC BLOOD PRESSURE: 130 MMHG | HEART RATE: 70 BPM | HEIGHT: 60 IN | DIASTOLIC BLOOD PRESSURE: 80 MMHG | OXYGEN SATURATION: 97 % | BODY MASS INDEX: 31.41 KG/M2

## 2024-01-22 DIAGNOSIS — B35.1 TINEA UNGUIUM: ICD-10-CM

## 2024-01-22 DIAGNOSIS — I63.89 OTHER CEREBRAL INFARCTION: ICD-10-CM

## 2024-01-22 DIAGNOSIS — I69.352: ICD-10-CM

## 2024-01-22 PROCEDURE — 99213 OFFICE O/P EST LOW 20 MIN: CPT

## 2024-02-27 PROBLEM — I63.89 CEREBROVASCULAR ACCIDENT (CVA) DUE TO OTHER MECHANISM: Status: ACTIVE | Noted: 2021-02-24

## 2024-02-27 PROBLEM — I69.352: Status: ACTIVE | Noted: 2021-02-04

## 2024-02-27 PROBLEM — B35.1 ONYCHOMYCOSIS OF TOENAIL: Status: ACTIVE | Noted: 2022-12-23

## 2024-02-27 NOTE — PHYSICAL EXAM
[General Appearance - Alert] : alert [General Appearance - In No Acute Distress] : in no acute distress [Varicose Veins Of Lower Extremities] : present [Ankle Swelling On The Right] : mild [1+] : left foot posterior tibialis 1+ [2+] : left foot dorsalis pedis 2+ [Affect] : the affect was normal [Oriented To Time, Place, And Person] : oriented to person, place, and time [de-identified] : 3/5 muscle strength for all muscles and tendons crossing the foot and ankle joints, to the left and 5/5 to the right   ankle joint and STJ ROM intact.  No pain with calf compression b/l.   [FreeTextEntry1] : Light touch sensation intact to the level of the digits b/l. Weakness to the left lower extremity

## 2024-02-27 NOTE — REASON FOR VISIT
[Follow-Up Visit] : a follow-up visit for [FreeTextEntry2] : Patient presents with concerns for painful, thickened, elongated nails of left hallux and 2nd digit, right 2nd digit

## 2024-02-27 NOTE — ASSESSMENT
[FreeTextEntry1] : Patient has been examined and evaluated. All of the patient's questions and concerns have been addressed in this visit.  Discussed various treatment options for management of mycotic nails, including topical vs. oral vs. laser intervention. The offending nail margins were mechanically and electrically debrided to the level of normal underlying nail bed with good relief obtained as evidenced by pain-free ambulation.  The patient was instructed to attempt to maintain the length and thickness of their nails as best as possible. Advised patient to continue wearing left foot brace for comfort while ambulating.  Patient has been advised to continue applying Ciclopirox to the affected nails; patient has been reminded of instructions for application.  Discussed physical therapy for strengthening appropriate ligaments/muscles. Rx for physical therapy has been provided to the patient.  RTC in 3 months for follow up.  Spent 20 minutes for patient care and medical decision making.

## 2024-02-27 NOTE — HISTORY OF PRESENT ILLNESS
[FreeTextEntry1] : Patient is a 68 year old female presenting for follow up for painful, thickened, elongated nails of left hallux and 2nd digit, right 2nd digit. Patient mentions that the nails cause her pain when walking and moving around in shoes. Patient states that she has been applying topical Ciclopirox to the nails on a daily basis since the past 6 months as instructed. Patient still relates the discoloration to the left hallux and 2nd digit. Patient adds that she experiences weakness in her left foot and pain in her right foot while walking with a cane. Patient expresses that she is unable to wiggle the toes in either foot. Patient conveys that she previously pursued physical therapy which provided transient relief but discontinued in 2020. Patient expresses that she wears a brace in her left foot when walking and moving around.

## 2024-02-27 NOTE — REVIEW OF SYSTEMS
[Skin Lesions] : skin lesion [Negative] : Endocrine [Fever] : no fever [Chills] : no chills [Eye Pain] : no eye pain [Red Eyes] : eyes not red [Chest Pain] : no chest pain [Earache] : no earache [Nosebleeds] : no nosebleeds [Shortness Of Breath] : no shortness of breath [Cough] : no cough [Abdominal Pain] : no abdominal pain [Diarrhea] : no diarrhea [Vomiting] : no vomiting [Joint Swelling] : no joint swelling [Joint Stiffness] : no joint stiffness [Skin Wound] : no skin wound [Confused] : no confusion [Dizziness] : no dizziness [FreeTextEntry9] : Osteoarthritis  [FreeTextEntry5] : Cerebrovascular accident  [de-identified] : discoloration noted to the left hallux and 2nd digit; b/l thickened and elongated nails

## 2024-04-22 ENCOUNTER — APPOINTMENT (OUTPATIENT)
Dept: PODIATRY | Facility: CLINIC | Age: 69
End: 2024-04-22

## 2024-04-22 VITALS
DIASTOLIC BLOOD PRESSURE: 77 MMHG | BODY MASS INDEX: 31.41 KG/M2 | OXYGEN SATURATION: 99 % | WEIGHT: 160 LBS | SYSTOLIC BLOOD PRESSURE: 133 MMHG | TEMPERATURE: 97.4 F | HEART RATE: 66 BPM | HEIGHT: 60 IN

## 2024-04-22 PROCEDURE — 11721 DEBRIDE NAIL 6 OR MORE: CPT

## 2024-04-26 NOTE — ASSESSMENT
[FreeTextEntry1] :  Patient seen and evaluated. Discussed etiology and treatment plan. Patient verbalized understanding. The offending nail margins x3 were mechanically and electrically debrided to the level of normal underlying nail bed with good relief obtained as evidenced by pain-free ambulation.  The patient was instructed to attempt to maintain the length and thickness of their nails as best as possible. Advised patient to continue wearing left foot brace for comfort while ambulating. Informed patient to apply band-aid to the scab at the left foot prior to wearing foot brace. Patient has been advised to continue applying Ciclopirox to the affected nails; patient has been reminded of instructions for application.  Informed patient to begin physical therapy for strengthening appropriate ligaments/muscles.  Advised patient to apply Mupirocin to the scab at the left foot to help with healing.  RTC in 3 months for follow up.  Spent 20 minutes for patient care and medical decision making.

## 2024-04-26 NOTE — PHYSICAL EXAM
[General Appearance - Alert] : alert [General Appearance - In No Acute Distress] : in no acute distress [Varicose Veins Of Lower Extremities] : bilaterally [Ankle Swelling On The Right] : mild [1+] : left foot posterior tibialis 1+ [2+] : left foot dorsalis pedis 2+ [Oriented To Time, Place, And Person] : oriented to person, place, and time [Affect] : the affect was normal [Ankle Swelling (On Exam)] : not present [] : not present [de-identified] : 3/5 muscle strength for all muscles and tendons crossing the foot and ankle joints, to the left and 5/5 to the right   ankle joint and STJ ROM intact.  No pain with calf compression b/l.   [FreeTextEntry1] : Light touch sensation intact to the level of the digits b/l. Weakness to the left lower extremity

## 2024-04-26 NOTE — REVIEW OF SYSTEMS
[Skin Lesions] : skin lesion [Negative] : Heme/Lymph [Fever] : no fever [Chills] : no chills [Eye Pain] : no eye pain [Red Eyes] : eyes not red [Earache] : no earache [Nosebleeds] : no nosebleeds [Chest Pain] : no chest pain [Shortness Of Breath] : no shortness of breath [Cough] : no cough [Abdominal Pain] : no abdominal pain [Vomiting] : no vomiting [Diarrhea] : no diarrhea [Joint Swelling] : no joint swelling [Joint Stiffness] : no joint stiffness [Skin Wound] : no skin wound [Confused] : no confusion [Dizziness] : no dizziness [FreeTextEntry5] : Cerebrovascular accident  [FreeTextEntry9] : Osteoarthritis, 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

## 2024-04-26 NOTE — HISTORY OF PRESENT ILLNESS
[FreeTextEntry1] : Patient is a 68-year-old female presenting for follow up for painful, thickened, elongated nails of left hallux and 2nd digit as well as right 2nd digit. Patient mentions that the nails cause her pain when walking and moving around while wearing shoes. Patient states that she has been applying Ciclopirox to the affected nails every day since the past 3 months as instructed previously. Patient relates discoloration to the left hallux and 2nd digit nails. Patient adds that she still experiences weakness in her left foot and pain in her right foot while walking with a cane. Patient expresses that she is still unable to wiggle the toes in either foot. Patient conveys that she pursued physical therapy which provided transient relief but discontinued in 2020. Patient mentions that she wears a brace in her left foot during ambulation. Patient notes that she developed a scab at her left foot due to the brace which causes discomfort.

## 2024-07-22 ENCOUNTER — APPOINTMENT (OUTPATIENT)
Dept: PODIATRY | Facility: CLINIC | Age: 69
End: 2024-07-22

## 2024-09-16 ENCOUNTER — APPOINTMENT (OUTPATIENT)
Dept: PODIATRY | Facility: CLINIC | Age: 69
End: 2024-09-16

## 2024-09-16 VITALS
TEMPERATURE: 97.3 F | HEIGHT: 60 IN | SYSTOLIC BLOOD PRESSURE: 101 MMHG | HEART RATE: 73 BPM | BODY MASS INDEX: 31.41 KG/M2 | WEIGHT: 160 LBS | DIASTOLIC BLOOD PRESSURE: 60 MMHG | OXYGEN SATURATION: 96 %

## 2024-09-16 PROCEDURE — 99213 OFFICE O/P EST LOW 20 MIN: CPT

## 2024-09-18 NOTE — REASON FOR VISIT
[Follow-Up Visit] : a follow-up visit for [Other:___] : [unfilled] [FreeTextEntry2] : Patient is here today for nail care.

## 2024-12-16 ENCOUNTER — APPOINTMENT (OUTPATIENT)
Dept: PODIATRY | Facility: CLINIC | Age: 69
End: 2024-12-16

## 2025-01-10 ENCOUNTER — APPOINTMENT (OUTPATIENT)
Dept: PODIATRY | Facility: CLINIC | Age: 70
End: 2025-01-10

## 2025-01-10 VITALS
OXYGEN SATURATION: 96 % | TEMPERATURE: 97.8 F | DIASTOLIC BLOOD PRESSURE: 76 MMHG | SYSTOLIC BLOOD PRESSURE: 153 MMHG | HEART RATE: 93 BPM | BODY MASS INDEX: 31.41 KG/M2 | WEIGHT: 160 LBS | HEIGHT: 60 IN

## 2025-01-10 PROCEDURE — 99213 OFFICE O/P EST LOW 20 MIN: CPT

## 2025-04-11 ENCOUNTER — APPOINTMENT (OUTPATIENT)
Dept: PODIATRY | Facility: CLINIC | Age: 70
End: 2025-04-11

## 2025-04-11 VITALS
OXYGEN SATURATION: 95 % | WEIGHT: 160 LBS | TEMPERATURE: 97.5 F | DIASTOLIC BLOOD PRESSURE: 64 MMHG | HEART RATE: 65 BPM | HEIGHT: 60 IN | BODY MASS INDEX: 31.41 KG/M2 | SYSTOLIC BLOOD PRESSURE: 105 MMHG

## 2025-04-11 PROCEDURE — 99213 OFFICE O/P EST LOW 20 MIN: CPT

## 2025-05-05 PROBLEM — M79.671 PAIN OF RIGHT HEEL: Status: ACTIVE | Noted: 2025-05-05

## 2025-07-14 ENCOUNTER — APPOINTMENT (OUTPATIENT)
Dept: PODIATRY | Facility: CLINIC | Age: 70
End: 2025-07-14
Payer: MEDICARE

## 2025-07-14 ENCOUNTER — APPOINTMENT (OUTPATIENT)
Dept: PODIATRY | Facility: CLINIC | Age: 70
End: 2025-07-14

## 2025-07-14 PROCEDURE — 99212 OFFICE O/P EST SF 10 MIN: CPT

## 2025-07-14 RX ORDER — UBIDECARENONE 100 MG
100 CAPSULE ORAL
Refills: 0 | Status: ACTIVE | COMMUNITY

## 2025-07-14 RX ORDER — PSYLLIUM HUSK 0.4 G
CAPSULE ORAL
Refills: 0 | Status: ACTIVE | COMMUNITY

## 2025-07-21 PROBLEM — Z78.9 SOCIAL ALCOHOL USE: Status: ACTIVE | Noted: 2025-07-14

## 2025-07-21 PROBLEM — Z83.3 FAMILY HISTORY OF DIABETES MELLITUS: Status: ACTIVE | Noted: 2025-07-14

## 2025-07-21 PROBLEM — I10 HYPERTENSION: Status: ACTIVE | Noted: 2025-07-14

## 2025-07-21 PROBLEM — Z83.438 FAMILY HISTORY OF HYPERLIPIDEMIA: Status: ACTIVE | Noted: 2025-07-14

## 2025-07-21 PROBLEM — D64.9 ANEMIA: Status: ACTIVE | Noted: 2025-07-14

## 2025-07-21 PROBLEM — H91.90 HARD OF HEARING: Status: ACTIVE | Noted: 2025-07-14

## 2025-07-21 PROBLEM — Z86.39 HISTORY OF HYPERLIPIDEMIA: Status: RESOLVED | Noted: 2025-07-14 | Resolved: 2025-07-14

## 2025-07-21 PROBLEM — E78.5 HYPERLIPIDEMIA: Status: ACTIVE | Noted: 2025-07-14

## 2025-07-21 PROBLEM — Z82.49 FAMILY HISTORY OF HYPERTENSION: Status: ACTIVE | Noted: 2025-07-14

## 2025-08-21 ENCOUNTER — APPOINTMENT (OUTPATIENT)
Dept: PODIATRY | Facility: CLINIC | Age: 70
End: 2025-08-21
Payer: MEDICARE

## 2025-08-21 DIAGNOSIS — B35.1 TINEA UNGUIUM: ICD-10-CM

## 2025-08-21 PROCEDURE — 99212 OFFICE O/P EST SF 10 MIN: CPT

## 2025-08-23 PROBLEM — B35.1 TINEA UNGUIUM: Status: ACTIVE | Noted: 2025-07-21
